# Patient Record
Sex: MALE | Race: WHITE | Employment: UNEMPLOYED | URBAN - NONMETROPOLITAN AREA
[De-identification: names, ages, dates, MRNs, and addresses within clinical notes are randomized per-mention and may not be internally consistent; named-entity substitution may affect disease eponyms.]

---
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
## 2021-03-18 ENCOUNTER — ALLIED HEALTH/NURSE VISIT (OUTPATIENT)
Dept: FAMILY MEDICINE | Facility: OTHER | Age: 21
End: 2021-03-18
Attending: FAMILY MEDICINE
Payer: COMMERCIAL

## 2021-03-18 DIAGNOSIS — Z20.822 COVID-19 RULED OUT: Primary | ICD-10-CM

## 2021-03-18 LAB
SARS-COV-2 RNA RESP QL NAA+PROBE: NORMAL
SPECIMEN SOURCE: NORMAL

## 2021-03-18 PROCEDURE — U0005 INFEC AGEN DETEC AMPLI PROBE: HCPCS | Mod: ZL | Performed by: FAMILY MEDICINE

## 2021-03-18 PROCEDURE — U0003 INFECTIOUS AGENT DETECTION BY NUCLEIC ACID (DNA OR RNA); SEVERE ACUTE RESPIRATORY SYNDROME CORONAVIRUS 2 (SARS-COV-2) (CORONAVIRUS DISEASE [COVID-19]), AMPLIFIED PROBE TECHNIQUE, MAKING USE OF HIGH THROUGHPUT TECHNOLOGIES AS DESCRIBED BY CMS-2020-01-R: HCPCS | Mod: ZL | Performed by: FAMILY MEDICINE

## 2021-03-18 PROCEDURE — C9803 HOPD COVID-19 SPEC COLLECT: HCPCS

## 2021-03-18 NOTE — PROCEDURES
Patient swabbed for COVID-19 testing.  Lucrecia Pace LPN on 3/18/2021 at 12:03 PM    [facility]

## 2021-03-19 LAB
LABORATORY COMMENT REPORT: NORMAL
SARS-COV-2 RNA RESP QL NAA+PROBE: NEGATIVE
SPECIMEN SOURCE: NORMAL

## 2021-05-11 ENCOUNTER — OFFICE VISIT (OUTPATIENT)
Dept: PSYCHIATRY | Facility: OTHER | Age: 21
End: 2021-05-11
Attending: PSYCHIATRY & NEUROLOGY
Payer: COMMERCIAL

## 2021-05-11 VITALS
TEMPERATURE: 97.8 F | SYSTOLIC BLOOD PRESSURE: 136 MMHG | WEIGHT: 194.4 LBS | OXYGEN SATURATION: 99 % | HEIGHT: 73 IN | BODY MASS INDEX: 25.76 KG/M2 | HEART RATE: 82 BPM | DIASTOLIC BLOOD PRESSURE: 80 MMHG | RESPIRATION RATE: 18 BRPM

## 2021-05-11 DIAGNOSIS — F41.9 ANXIETY: Primary | ICD-10-CM

## 2021-05-11 PROCEDURE — G0463 HOSPITAL OUTPT CLINIC VISIT: HCPCS

## 2021-05-11 PROCEDURE — 99204 OFFICE O/P NEW MOD 45 MIN: CPT | Performed by: PSYCHIATRY & NEUROLOGY

## 2021-05-11 RX ORDER — CITALOPRAM HYDROBROMIDE 20 MG/1
20 TABLET ORAL AT BEDTIME
Qty: 30 TABLET | Refills: 1 | Status: SHIPPED | OUTPATIENT
Start: 2021-05-11

## 2021-05-11 RX ORDER — CITALOPRAM HYDROBROMIDE 10 MG/1
10 TABLET ORAL AT BEDTIME
Qty: 7 TABLET | Refills: 1 | Status: SHIPPED | OUTPATIENT
Start: 2021-05-11

## 2021-05-11 RX ORDER — HYDROCHLOROTHIAZIDE 25 MG/1
25 TABLET ORAL DAILY
COMMUNITY

## 2021-05-11 SDOH — HEALTH STABILITY: MENTAL HEALTH: HOW OFTEN DO YOU HAVE 6 OR MORE DRINKS ON ONE OCCASION?: NOT ASKED

## 2021-05-11 SDOH — HEALTH STABILITY: MENTAL HEALTH: HOW MANY STANDARD DRINKS CONTAINING ALCOHOL DO YOU HAVE ON A TYPICAL DAY?: NOT ASKED

## 2021-05-11 SDOH — HEALTH STABILITY: MENTAL HEALTH: HOW OFTEN DO YOU HAVE A DRINK CONTAINING ALCOHOL?: NOT ASKED

## 2021-05-11 ASSESSMENT — PATIENT HEALTH QUESTIONNAIRE - PHQ9
SUM OF ALL RESPONSES TO PHQ QUESTIONS 1-9: 13
5. POOR APPETITE OR OVEREATING: SEVERAL DAYS

## 2021-05-11 ASSESSMENT — ANXIETY QUESTIONNAIRES
7. FEELING AFRAID AS IF SOMETHING AWFUL MIGHT HAPPEN: NOT AT ALL
6. BECOMING EASILY ANNOYED OR IRRITABLE: SEVERAL DAYS
1. FEELING NERVOUS, ANXIOUS, OR ON EDGE: NEARLY EVERY DAY
5. BEING SO RESTLESS THAT IT IS HARD TO SIT STILL: SEVERAL DAYS
GAD7 TOTAL SCORE: 11
IF YOU CHECKED OFF ANY PROBLEMS ON THIS QUESTIONNAIRE, HOW DIFFICULT HAVE THESE PROBLEMS MADE IT FOR YOU TO DO YOUR WORK, TAKE CARE OF THINGS AT HOME, OR GET ALONG WITH OTHER PEOPLE: VERY DIFFICULT
3. WORRYING TOO MUCH ABOUT DIFFERENT THINGS: NEARLY EVERY DAY
2. NOT BEING ABLE TO STOP OR CONTROL WORRYING: MORE THAN HALF THE DAYS

## 2021-05-11 ASSESSMENT — MIFFLIN-ST. JEOR: SCORE: 1950.67

## 2021-05-11 ASSESSMENT — PAIN SCALES - GENERAL: PAINLEVEL: NO PAIN (0)

## 2021-05-11 NOTE — PROGRESS NOTES
"Outpatient Psychiatric Diagnostic Evaluation    Name: Dylon Mcintyre      : 2000   Date: 2021    Source of Referral:  [facility]    Identifying Data:  This is a 20-year-old young man seen for psychiatric evaluation and treatment of anxiety    Chief Complaint:   Patient presents with:  Depression  Anxiety     \"Anxiety \"    HPI:  Patient reports problems with anxiety since at least elementary school.  He had some brief psychotherapy when he was in high school, but did not find it helpful and did not have any other treatment.  Patient reports that he has continued to have problems and says that he has been \"constantly struggling with anxiety\".  He is currently in a substance use treatment center for the past approximately 2 months and finds that his anxiety has been interfering with his ability to stay focused in the program and to participate as fully as he might like.    Psychiatric Review of Symptoms:  Anxiety, increased irritability, trouble talking to \"new people\", trouble falling asleep and staying asleep, trouble going into new situations, feeling that people are watching him    Psychiatric History:  Patient reports problems since elementary school and says that he did talk to a doctor when he was younger, but patient felt that the doctor thought he was drug-seeking and he did not get any treatment.  He reports problems increased in high school and he did see a counselor briefly, but felt that it was not a good fit and he did not continue with the treatment.  He has never been treated with psychiatric medications.    Chemical Use History:    Patient reports problems with alcohol use, including history of blackouts.  He denies DUIs or withdrawal seizures.  Patient has used marijuana, but does not report any problems from it.    Past Medical History:  No past medical history on file.       Current psychiatric medications:  None    Family History:    Patient reports that his mother has " had problems with anxiety and depression.  He also reports that his older sister had problems with anxiety and took medications.  He has an older brother who he also says has problems with anxiety.  Patient says there are problems with alcohol use on father's side of the family.  Patient does not know of any family history of bipolar disorder or schizophrenia.      Social History:  Social History     Socioeconomic History     Marital status: Single     Spouse name: Not on file     Number of children: Not on file     Years of education: Not on file     Highest education level: Not on file   Occupational History     Not on file   Social Needs     Financial resource strain: Not on file     Food insecurity     Worry: Not on file     Inability: Not on file     Transportation needs     Medical: Not on file     Non-medical: Not on file   Tobacco Use     Smoking status: Current Every Day Smoker     Packs/day: 0.25     Types: Cigarettes     Smokeless tobacco: Former User   Substance and Sexual Activity     Alcohol use: Not Currently     Drug use: Not Currently     Types: Marijuana     Sexual activity: Not Currently   Lifestyle     Physical activity     Days per week: Not on file     Minutes per session: Not on file     Stress: Not on file   Relationships     Social connections     Talks on phone: Not on file     Gets together: Not on file     Attends Druze service: Not on file     Active member of club or organization: Not on file     Attends meetings of clubs or organizations: Not on file     Relationship status: Not on file     Intimate partner violence     Fear of current or ex partner: Not on file     Emotionally abused: Not on file     Physically abused: Not on file     Forced sexual activity: Not on file   Other Topics Concern     Not on file   Social History Narrative     Not on file      Patient is a high school graduate and is planning to get further schooling and hopes to get a 's license.  He is currently  residing at [facility]    Mental Status Exam:  This is an alert, cooperative, fully oriented young man appearing stated age.  Speech is normal rate, rhythm and volume.  Memory and cognition are intact.  Mood shows moderate to marked anxiety and minimal  depression.  Affect is full range without  lability.  Patient denies suicidal or homicidal ideation. Patient does exhibit a tendency toward paranoid ideation.  Thought processes are goal-directed without hallucinations or delusions.  Insight and judgment are adequate.     Diagnosis:  Anxiety, unspecified  Rule out social anxiety  Insomnia  Alcohol use disorder    Impression/Assessment:  This patient has apparently had a long history of anxiety that has never had significant treatment.  He does not appear to have any significant depression at this time.  He has some fairly minor sleep problems with some trouble getting to sleep and some waking during the night, that does not keep him up for long periods of time.  He could benefit from a trial of an SSRI and he expresses an interest in using as needed melatonin to help with sleep initiation, as he has found this helpful in the past.  We also discussed possibly adding a medication, such as trazodone, to help with sleep maintenance if this becomes more of a problem.  Patient is also encouraged to consider individual psychotherapy if medication treatment alone is not sufficient to treat his anxiety      Treatment Plan:  1. Trial of citalopram for anxiety, 10 mg a night for 1 week and then 20 mg at night  2. As needed melatonin for sleep initiation  3. Continue full treatment program  4. Follow-up with Dr. Bradley in 1 month    Time spent on day of visit 56 minutes-4 minutes (8:34 AM-8:38 AM)  review of EMR prior to visit, 35 minutes (2:50 PM through 3:25 PM)  face-to-face meeting with patient , including discussion of risk/benefits, alternatives and possible side effects to medication, detailed verbal  and written instructions on medication dosage adjustments, counseling on above issues, and prescription of medications in the EMR, 17 minutes (3:25 PM through 3:42 PM)  documentation in the EMR      Signed: Wilbert Bradley MD on 5/11/2021 at 3:26 PM

## 2021-05-11 NOTE — PATIENT INSTRUCTIONS
Citalopram 10 mg, 20 mg:  Take 1 daily, usually at night  Start with 10 mg every evening for 1 week  Then increase to 20 mg every day.

## 2021-05-11 NOTE — NURSING NOTE
"Chief Complaint   Patient presents with     Depression     Anxiety       Initial /80 (BP Location: Right arm, Patient Position: Sitting, Cuff Size: Adult Regular)   Pulse 82   Temp 97.8  F (36.6  C) (Tympanic)   Resp 18   Ht 1.854 m (6' 1\")   Wt 88.2 kg (194 lb 6.4 oz)   SpO2 99%   BMI 25.65 kg/m   Estimated body mass index is 25.65 kg/m  as calculated from the following:    Height as of this encounter: 1.854 m (6' 1\").    Weight as of this encounter: 88.2 kg (194 lb 6.4 oz).  Medication Reconciliation: complete    RASHID PRATER, [place]  "

## 2021-05-12 ASSESSMENT — ANXIETY QUESTIONNAIRES: GAD7 TOTAL SCORE: 11

## 2021-06-14 ENCOUNTER — OFFICE VISIT (OUTPATIENT)
Dept: PSYCHIATRY | Facility: OTHER | Age: 21
End: 2021-06-14
Attending: PSYCHIATRY & NEUROLOGY
Payer: COMMERCIAL

## 2021-06-14 VITALS
WEIGHT: 195.2 LBS | BODY MASS INDEX: 25.75 KG/M2 | OXYGEN SATURATION: 95 % | RESPIRATION RATE: 18 BRPM | HEART RATE: 80 BPM | TEMPERATURE: 98.3 F | SYSTOLIC BLOOD PRESSURE: 136 MMHG | DIASTOLIC BLOOD PRESSURE: 86 MMHG

## 2021-06-14 DIAGNOSIS — F41.9 ANXIETY: Primary | ICD-10-CM

## 2021-06-14 PROCEDURE — G0463 HOSPITAL OUTPT CLINIC VISIT: HCPCS

## 2021-06-14 PROCEDURE — 99214 OFFICE O/P EST MOD 30 MIN: CPT | Performed by: PSYCHIATRY & NEUROLOGY

## 2021-06-14 RX ORDER — CITALOPRAM HYDROBROMIDE 20 MG/1
20 TABLET ORAL AT BEDTIME
Qty: 30 TABLET | Refills: 4 | Status: SHIPPED | OUTPATIENT
Start: 2021-06-14

## 2021-06-14 ASSESSMENT — ANXIETY QUESTIONNAIRES
3. WORRYING TOO MUCH ABOUT DIFFERENT THINGS: NOT AT ALL
1. FEELING NERVOUS, ANXIOUS, OR ON EDGE: SEVERAL DAYS
GAD7 TOTAL SCORE: 1
7. FEELING AFRAID AS IF SOMETHING AWFUL MIGHT HAPPEN: NOT AT ALL
5. BEING SO RESTLESS THAT IT IS HARD TO SIT STILL: NOT AT ALL
2. NOT BEING ABLE TO STOP OR CONTROL WORRYING: NOT AT ALL
IF YOU CHECKED OFF ANY PROBLEMS ON THIS QUESTIONNAIRE, HOW DIFFICULT HAVE THESE PROBLEMS MADE IT FOR YOU TO DO YOUR WORK, TAKE CARE OF THINGS AT HOME, OR GET ALONG WITH OTHER PEOPLE: NOT DIFFICULT AT ALL
6. BECOMING EASILY ANNOYED OR IRRITABLE: NOT AT ALL

## 2021-06-14 ASSESSMENT — PATIENT HEALTH QUESTIONNAIRE - PHQ9: 5. POOR APPETITE OR OVEREATING: NOT AT ALL

## 2021-06-14 ASSESSMENT — PAIN SCALES - GENERAL: PAINLEVEL: NO PAIN (0)

## 2021-06-14 NOTE — PROGRESS NOTES
"Psychiatric Progress Note/Visit  June 14, 2021    Identifying Data:  This is a 21-year-old young man seen for follow-up psychiatric medication management visit for treatment of anxiety and insomnia    Interval History:  Patient was seen on May 11, 2021 for psychiatric evaluation.  At that time he was complaining of \"constant anxiety\" and trouble falling asleep with frequent waking.  We decided on a trial of citalopram with first target dose 20 mg a night.  Today patient reports that he is doing \"pretty good\" and says that his sleep is \"good\". Also he is currently finishing his substance use treatment program on June 21 and will be moving to [place] for a sober living housing placement.  Patient also reports that he rarely uses as needed melatonin at night for sleep initiation.  Patient also inquired about the need to continue taking medication for his high blood pressure (he is currently taking 25 mg of hydrochlorothiazide daily).  Patient's blood pressure today was 136/86.    Mental Status Exam:  Anxiety has markedly decreased, in the minimal to mild range.  Patient did not exhibit any paranoid tendencies in today's visit.  Remainder of MSE is essentially unchanged from May 11, 2021.    Current Psychiatric Medications:  Citalopram 20 mg every night  Melatonin as needed at bedtime    Lab Tests/Results:  /86  No laboratory studies were ordered previously and none are being ordered today    Diagnosis:  Anxiety, unspecified  Insomnia, in remission    Impression/Assessment:  Patient has had a very positive response to 20 mg citalopram every night.  His anxiety is markedly decreased and does not appear to be causing any impairment at present.  His sleep has also improved significantly and he has not needed to use melatonin on a regular basis.  We discussed the possibility of patient receiving psychiatric medication management follow-up in the [place] area, as he will be living there and he appears stable on his " current medications.  We will schedule 1 more follow-up visit and patient will explore the possibility of further follow-up in the [place] area.  Concerning patient's blood pressure medication, I have recommended that he continue taking this medication until a primary care physician recommends that he stops this, especially because of his current blood pressure reading today of 136/86.    Plan:  1.  Continue citalopram 20 mg at night for anxiety and insomnia  2.  Follow-up with PCP concerning blood pressure medication  3.  Followup with Dr. Bradley in 1 month      Time spent on day of visit 33 minutes -4 minutes (8:31 AM-8:35 AM)  review of EMR prior to visit, 17 minutes (9:43 AM through 10 AM)  face-to-face meeting with patient, including discussion of ongoing medication management, counseling on above issues, and prescription of medications in the EMR, 12 minutes (10 AM through 10:12 AM)  documentation in the EMR      Wilbert Bradley MD

## 2021-06-14 NOTE — NURSING NOTE
"Chief Complaint   Patient presents with     RECHECK     Anxiety, Depression       Initial /86 (BP Location: Right arm, Patient Position: Sitting, Cuff Size: Adult Regular)   Pulse 80   Temp 98.3  F (36.8  C) (Tympanic)   Resp 18   Wt 88.5 kg (195 lb 3.2 oz)   SpO2 95%   BMI 25.75 kg/m   Estimated body mass index is 25.75 kg/m  as calculated from the following:    Height as of 5/11/21: 1.854 m (6' 1\").    Weight as of this encounter: 88.5 kg (195 lb 3.2 oz).  Medication Reconciliation: complete    Maricarmen Jimenez LPN    "

## 2021-06-15 ASSESSMENT — ANXIETY QUESTIONNAIRES: GAD7 TOTAL SCORE: 1
